# Patient Record
Sex: MALE | Race: WHITE
[De-identification: names, ages, dates, MRNs, and addresses within clinical notes are randomized per-mention and may not be internally consistent; named-entity substitution may affect disease eponyms.]

---

## 2017-07-26 ENCOUNTER — HOSPITAL ENCOUNTER (OUTPATIENT)
Dept: HOSPITAL 53 - M LABDRAW1 | Age: 67
End: 2017-07-26
Attending: INTERNAL MEDICINE
Payer: COMMERCIAL

## 2017-07-26 DIAGNOSIS — D64.9: Primary | ICD-10-CM

## 2017-07-26 DIAGNOSIS — I10: ICD-10-CM

## 2017-07-26 DIAGNOSIS — Z12.5: ICD-10-CM

## 2017-07-26 DIAGNOSIS — E78.00: ICD-10-CM

## 2017-07-26 DIAGNOSIS — R53.83: ICD-10-CM

## 2017-07-26 DIAGNOSIS — Z11.59: ICD-10-CM

## 2017-07-26 LAB
ALBUMIN SERPL BCG-MCNC: 4 GM/DL (ref 3.2–5.2)
ALBUMIN/GLOB SERPL: 1.33 {RATIO} (ref 1–1.93)
ALP SERPL-CCNC: 72 U/L (ref 45–117)
ALT SERPL W P-5'-P-CCNC: 17 U/L (ref 12–78)
ANION GAP SERPL CALC-SCNC: 7 MEQ/L (ref 8–16)
AST SERPL-CCNC: 18 U/L (ref 15–37)
BASOPHILS # BLD AUTO: 0 K/MM3 (ref 0–0.2)
BASOPHILS NFR BLD AUTO: 0.7 % (ref 0–1)
BILIRUB SERPL-MCNC: 0.4 MG/DL (ref 0.2–1)
BUN SERPL-MCNC: 10 MG/DL (ref 7–18)
CALCIUM SERPL-MCNC: 8.8 MG/DL (ref 8.8–10.2)
CHLORIDE SERPL-SCNC: 102 MEQ/L (ref 98–107)
CHOLEST SERPL-MCNC: 124 MG/DL (ref ?–200)
CO2 SERPL-SCNC: 28 MEQ/L (ref 21–32)
CREAT SERPL-MCNC: 0.94 MG/DL (ref 0.7–1.3)
EOSINOPHIL # BLD AUTO: 0.2 K/MM3 (ref 0–0.5)
EOSINOPHIL NFR BLD AUTO: 5.4 % (ref 0–3)
ERYTHROCYTE [DISTWIDTH] IN BLOOD BY AUTOMATED COUNT: 13.2 % (ref 11.5–14.5)
EST. AVERAGE GLUCOSE BLD GHB EST-MCNC: 117 MG/DL (ref 60–110)
GFR SERPL CREATININE-BSD FRML MDRD: > 60 ML/MIN/{1.73_M2} (ref 49–?)
GLUCOSE SERPL-MCNC: 86 MG/DL (ref 80–110)
LARGE UNSTAINED CELL #: 0.1 K/MM3 (ref 0–0.4)
LARGE UNSTAINED CELL %: 2.5 % (ref 0–4)
LYMPHOCYTES # BLD AUTO: 1.4 K/MM3 (ref 1.5–4.5)
LYMPHOCYTES NFR BLD AUTO: 28 % (ref 24–44)
MCH RBC QN AUTO: 30 PG (ref 27–33)
MCHC RBC AUTO-ENTMCNC: 34.4 G/DL (ref 32–36.5)
MCV RBC AUTO: 87.1 FL (ref 80–96)
MONOCYTES # BLD AUTO: 0.3 K/MM3 (ref 0–0.8)
MONOCYTES NFR BLD AUTO: 5.8 % (ref 0–5)
NEUTROPHILS # BLD AUTO: 2.7 K/MM3 (ref 1.8–7.7)
NEUTROPHILS NFR BLD AUTO: 57.7 % (ref 36–66)
PLATELET # BLD AUTO: 226 K/MM3 (ref 150–450)
POTASSIUM SERPL-SCNC: 4.4 MEQ/L (ref 3.5–5.1)
PROT SERPL-MCNC: 7 GM/DL (ref 6.4–8.2)
SODIUM SERPL-SCNC: 137 MEQ/L (ref 136–145)
TRIGL SERPL-MCNC: 98 MG/DL (ref ?–150)
VIT B12 SERPL-MCNC: 233 PG/ML (ref 247–911)
WBC # BLD AUTO: 4.7 K/MM3 (ref 4–10)

## 2017-07-26 PROCEDURE — 83550 IRON BINDING TEST: CPT

## 2017-07-26 PROCEDURE — 82607 VITAMIN B-12: CPT

## 2017-07-26 PROCEDURE — 80061 LIPID PANEL: CPT

## 2017-07-26 PROCEDURE — 82306 VITAMIN D 25 HYDROXY: CPT

## 2017-07-26 PROCEDURE — 82728 ASSAY OF FERRITIN: CPT

## 2017-07-26 PROCEDURE — 86803 HEPATITIS C AB TEST: CPT

## 2017-07-26 PROCEDURE — 83036 HEMOGLOBIN GLYCOSYLATED A1C: CPT

## 2017-07-26 PROCEDURE — 36415 COLL VENOUS BLD VENIPUNCTURE: CPT

## 2017-07-26 PROCEDURE — 84443 ASSAY THYROID STIM HORMONE: CPT

## 2017-07-26 PROCEDURE — 80053 COMPREHEN METABOLIC PANEL: CPT

## 2017-07-26 PROCEDURE — 85025 COMPLETE CBC W/AUTO DIFF WBC: CPT

## 2017-07-27 LAB
FERRITIN SERPL-MCNC: 62 NG/ML (ref 26–388)
IRON SATN MFR SERPL: 25.6 % (ref 19.7–37.4)
TIBC SERPL-MCNC: 332 UG/DL (ref 250–450)

## 2018-01-16 ENCOUNTER — HOSPITAL ENCOUNTER (OUTPATIENT)
Dept: HOSPITAL 53 - M LABDRAW1 | Age: 68
End: 2018-01-16
Attending: PSYCHIATRY & NEUROLOGY
Payer: COMMERCIAL

## 2018-01-16 DIAGNOSIS — R51: Primary | ICD-10-CM

## 2018-01-16 LAB
25(OH)D3 SERPL-MCNC: 41.2 NG/ML (ref 30–100)
ALBUMIN/GLOBULIN RATIO: 1.39 (ref 1–1.93)
ALBUMIN: 3.9 GM/DL (ref 3.2–5.2)
ALKALINE PHOSPHATASE: 76 U/L (ref 45–117)
ALT SERPL W P-5'-P-CCNC: 17 U/L (ref 12–78)
ANION GAP: 4 MEQ/L (ref 8–16)
AST SERPL-CCNC: 19 U/L (ref 7–37)
BASO #: 0 10^3/UL (ref 0–0.2)
BASO %: 0.4 % (ref 0–1)
BILIRUBIN,TOTAL: 0.3 MG/DL (ref 0.2–1)
BLOOD UREA NITROGEN: 12 MG/DL (ref 7–18)
CALCIUM LEVEL: 8.9 MG/DL (ref 8.8–10.2)
CARBON DIOXIDE LEVEL: 28 MEQ/L (ref 21–32)
CHLORIDE LEVEL: 107 MEQ/L (ref 98–107)
CHOLEST SERPL-MCNC: 116 MG/DL (ref ?–200)
CHOLESTEROL RISK RATIO: 2.58 (ref ?–5)
CREATININE FOR GFR: 1 MG/DL (ref 0.7–1.3)
DRVVT CONFIRM PPP QL: 34.8 SEC
DRVVT SCREEN TO CONFIRM RATIO: 0.9 {RATIO} (ref 0–1.2)
EOS #: 0.4 10^3/UL (ref 0–0.5)
EOSINOPHIL NFR BLD AUTO: 8.3 % (ref 0–3)
ERYTHROCYTE SEDIMENTATION RATE: 5 MM/HR (ref 0–20)
EST. AVERAGE GLUCOSE BLD GHB EST-MCNC: 123 MG/DL (ref 60–110)
FOLATE SERPL-MCNC: 8.9 NG/ML (ref 5.4–?)
GFR SERPL CREATININE-BSD FRML MDRD: > 60 ML/MIN/{1.73_M2} (ref 49–?)
GLUCOSE, FASTING: 97 MG/DL (ref 80–110)
HDLC SERPL-MCNC: 45 MG/DL (ref 40–?)
HEMATOCRIT: 40.7 % (ref 42–52)
HEMOGLOBIN: 13.4 G/DL (ref 14–18)
IMMATURE GRANULOCYTE #: 0 10^3/UL (ref 0–0)
IMMATURE GRANULOCYTE %: 0.2 % (ref 0–0)
LDL CHOLESTEROL: 55.8 MG/DL (ref ?–100)
LYMPH #: 1.7 10^3/UL (ref 1.5–4.5)
LYMPH %: 35.9 % (ref 24–44)
MEAN CORPUSCULAR HEMOGLOBIN: 28.9 PG (ref 27–33)
MEAN CORPUSCULAR HGB CONC: 32.9 G/DL (ref 32–36.5)
MEAN CORPUSCULAR VOLUME: 87.9 FL (ref 80–96)
MONO #: 0.4 10^3/UL (ref 0–0.8)
MONO %: 8.9 % (ref 0–5)
NEUTROPHILS #: 2.2 10^3/UL (ref 1.8–7.7)
NEUTROPHILS %: 46.3 % (ref 36–66)
NONHDLC SERPL-MCNC: 71 MG/DL
NRBC BLD AUTO-RTO: 0 % (ref 0–0)
PLATELET COUNT, AUTOMATED: 251 10^3/UL (ref 150–450)
POTASSIUM SERUM: 4.4 MEQ/L (ref 3.5–5.1)
RED BLOOD COUNT: 4.63 10^6/UL (ref 4.3–6.1)
RED CELL DISTRIBUTION WIDTH: 13.6 % (ref 11.5–14.5)
RHEUMATOID FACTOR QUANT: < 10 IU/ML (ref 0–15)
SODIUM LEVEL: 139 MEQ/L (ref 136–145)
TOTAL PROTEIN: 6.7 GM/DL (ref 6.4–8.2)
TRIGLYCERIDES LEVEL: 76 MG/DL (ref ?–150)
VITAMIN B12 LEVEL: 586 PG/ML (ref 247–911)
WHITE BLOOD COUNT: 4.7 10^3/UL (ref 4–10)

## 2018-01-16 PROCEDURE — 82746 ASSAY OF FOLIC ACID SERUM: CPT

## 2018-01-19 LAB
ALBUMIN %: 60.2 % (ref 55.8–66.1)
ALBUMIN: 4.03 GM/DL (ref 3.29–5.55)
ALPHA-1-GLOBULIN %: 4 % (ref 2.9–4.9)
ALPHA-1-GLOBULINS: 0.27 GM/DL (ref 0.17–0.41)
ALPHA-2-GLOBULINS %: 10.1 % (ref 7.1–11.8)
ALPHA-2-GLOBULINS: 0.68 GM/DL (ref 0.42–0.99)
B-GLOBULIN SERPL ELPH-MCNC: 0.43 GM/DL (ref 0.28–0.6)
BETA1 GLOB MFR SERPL ELPH: 6.4 % (ref 4.7–7.2)
BETA2 GLOB MFR SERPL ELPH: 4.7 % (ref 3.2–6.5)
BETA2 GLOB SERPL ELPH-MCNC: 0.31 GM/DL (ref 0.19–0.55)
GAMMA GLOBULIN %: 14.6 % (ref 11.1–18.8)
GAMMA GLOBULINS: 0.98 GM/DL (ref 0.65–1.58)
SPEP INTERPRETATION: (no result)

## 2018-01-22 LAB
VITAMIN B1 LEVEL WHOLE BLOOD: 100.2 NMOL/L (ref 66.5–200)
VITAMIN B6,PYRIDOXAL PHOSPHATE: 12.8 UG/L (ref 5.3–46.7)

## 2018-06-20 ENCOUNTER — HOSPITAL ENCOUNTER (OUTPATIENT)
Dept: HOSPITAL 53 - M LABDRAW1 | Age: 68
End: 2018-06-20
Attending: INTERNAL MEDICINE
Payer: COMMERCIAL

## 2018-06-20 DIAGNOSIS — I10: ICD-10-CM

## 2018-06-20 DIAGNOSIS — E78.00: ICD-10-CM

## 2018-06-20 DIAGNOSIS — Z12.5: Primary | ICD-10-CM

## 2018-06-20 DIAGNOSIS — Z79.899: ICD-10-CM

## 2018-06-20 DIAGNOSIS — R10.12: ICD-10-CM

## 2018-06-20 LAB
ALBUMIN/GLOBULIN RATIO: 1.09 (ref 1–1.93)
ALBUMIN: 3.7 GM/DL (ref 3.2–5.2)
ALKALINE PHOSPHATASE: 76 U/L (ref 45–117)
ALT SERPL W P-5'-P-CCNC: 23 U/L (ref 12–78)
ANION GAP: 8 MEQ/L (ref 8–16)
AST SERPL-CCNC: 27 U/L (ref 7–37)
BILIRUBIN,TOTAL: 0.4 MG/DL (ref 0.2–1)
BLOOD UREA NITROGEN: 14 MG/DL (ref 7–18)
CALCIUM LEVEL: 8.7 MG/DL (ref 8.8–10.2)
CARBON DIOXIDE LEVEL: 29 MEQ/L (ref 21–32)
CHLORIDE LEVEL: 106 MEQ/L (ref 98–107)
CHOLEST SERPL-MCNC: 147 MG/DL (ref ?–200)
CHOLESTEROL RISK RATIO: 2.88 (ref ?–5)
CREATININE FOR GFR: 0.91 MG/DL (ref 0.7–1.3)
ERYTHROCYTE SEDIMENTATION RATE: 2 MM/HR (ref 0–20)
EST. AVERAGE GLUCOSE BLD GHB EST-MCNC: 114 MG/DL (ref 60–110)
GFR SERPL CREATININE-BSD FRML MDRD: > 60 ML/MIN/{1.73_M2} (ref 49–?)
GLUCOSE, FASTING: 80 MG/DL (ref 70–100)
HDLC SERPL-MCNC: 51 MG/DL (ref 40–?)
HEMATOCRIT: 44 % (ref 42–52)
HEMOGLOBIN: 14.2 G/DL (ref 13.5–17.5)
LDL CHOLESTEROL: 80.4 MG/DL (ref ?–100)
MEAN CORPUSCULAR HEMOGLOBIN: 28.5 PG (ref 27–33)
MEAN CORPUSCULAR HGB CONC: 32.3 G/DL (ref 32–36.5)
MEAN CORPUSCULAR VOLUME: 88.4 FL (ref 80–96)
NONHDLC SERPL-MCNC: 96 MG/DL
NRBC BLD AUTO-RTO: 0 % (ref 0–0)
PLATELET COUNT, AUTOMATED: 272 10^3/UL (ref 150–450)
POTASSIUM SERUM: 4.4 MEQ/L (ref 3.5–5.1)
PSA SERPL-MCNC: 0.17 NG/ML (ref ?–4)
RED BLOOD COUNT: 4.98 10^6/UL (ref 4.3–6.1)
RED CELL DISTRIBUTION WIDTH: 14 % (ref 11.5–14.5)
SODIUM LEVEL: 143 MEQ/L (ref 136–145)
TOTAL PROTEIN: 7.1 GM/DL (ref 6.4–8.2)
TRIGLYCERIDES LEVEL: 78 MG/DL (ref ?–150)
WHITE BLOOD COUNT: 6.4 10^3/UL (ref 4–10)

## 2018-06-20 PROCEDURE — 82150 ASSAY OF AMYLASE: CPT

## 2018-06-21 LAB
AMYLASE SERPL-CCNC: 69 U/L (ref 25–115)
LIPASE: 114 U/L (ref 73–393)

## 2018-06-22 LAB — H PYLORI IGM SER-ACNC: <9 UNITS (ref 0–8.9)

## 2018-06-26 ENCOUNTER — HOSPITAL ENCOUNTER (OUTPATIENT)
Dept: HOSPITAL 53 - M RAD | Age: 68
End: 2018-06-26
Attending: INTERNAL MEDICINE
Payer: COMMERCIAL

## 2018-06-26 DIAGNOSIS — R10.12: Primary | ICD-10-CM

## 2019-06-19 ENCOUNTER — HOSPITAL ENCOUNTER (OUTPATIENT)
Dept: HOSPITAL 53 - M RAD | Age: 69
End: 2019-06-19
Attending: PEDIATRICS
Payer: COMMERCIAL

## 2019-06-19 DIAGNOSIS — R05: Primary | ICD-10-CM

## 2019-06-20 NOTE — REP
CHEST, TWO VIEWS:

 

COMPARISON:  07/02/2009

 

There is no evidence of acute infiltrate.

 

No pleural effusion is seen.

 

The heart is normal in size.

 

The mediastinal silhouette is unremarkable.

 

The visualized osseous structures are intact.

 

There are degenerative changes of the spine.

 

IMPRESSION:

 

No acute pulmonary disease.

 

 

Electronically Signed by

Sivakumar Oh MD 06/20/2019 09:15 A

## 2019-07-09 ENCOUNTER — HOSPITAL ENCOUNTER (OUTPATIENT)
Dept: HOSPITAL 53 - M RAD | Age: 69
End: 2019-07-09
Attending: SPECIALIST
Payer: COMMERCIAL

## 2019-07-09 DIAGNOSIS — M77.9: ICD-10-CM

## 2019-07-09 DIAGNOSIS — M25.552: Primary | ICD-10-CM

## 2019-07-09 NOTE — REP
Pelvis, left hip:  Three views.

 

History:  Iliac crest pain.  Comparison is made with images from CT study of the

abdomen June 26, 2018.

 

Findings:  AP view of the pelvis and AP and frog-leg views of the left hip are

presented.  The femoral heads are smooth and rounded and hip joint spaces are

preserved.  Transitionalized lumbosacral junction is seen.  SI joints are

unremarkable.  Laminectomy has been performed at L3.  There is spurring along the

anterolateral contour of the iliac bone bilaterally consistent with muscle

attachment spurring.  This is bilateral, symmetric and benign in appearance.

Bones, joints and soft tissues are otherwise unremarkable.

 

Impression:

 

Mild muscle attachment spurring in the region of the anterior inferior iliac

spine bilaterally and symmetrically.  Otherwise unremarkable pelvis, left hip

radiographs.  Transitionalized lumbosacral junction.  Laminectomy defect in the

lower lumbar spine.

 

 

Electronically Signed by

David Low MD 07/09/2019 04:43 P

## 2019-07-31 ENCOUNTER — HOSPITAL ENCOUNTER (OUTPATIENT)
Dept: HOSPITAL 53 - M LABDRAW1 | Age: 69
End: 2019-07-31
Attending: INTERNAL MEDICINE
Payer: COMMERCIAL

## 2019-07-31 DIAGNOSIS — Z13.1: ICD-10-CM

## 2019-07-31 DIAGNOSIS — E55.9: Primary | ICD-10-CM

## 2019-07-31 DIAGNOSIS — I10: ICD-10-CM

## 2019-07-31 DIAGNOSIS — Z12.5: ICD-10-CM

## 2019-07-31 DIAGNOSIS — E53.8: ICD-10-CM

## 2019-07-31 DIAGNOSIS — Z79.899: ICD-10-CM

## 2019-07-31 DIAGNOSIS — E78.00: ICD-10-CM

## 2019-07-31 DIAGNOSIS — M89.8X8: ICD-10-CM

## 2019-07-31 LAB
25(OH)D3 SERPL-MCNC: 41.8 NG/ML (ref 30–100)
ALBUMIN SERPL BCG-MCNC: 3.9 GM/DL (ref 3.2–5.2)
ALT SERPL W P-5'-P-CCNC: 17 U/L (ref 12–78)
BILIRUB SERPL-MCNC: 0.4 MG/DL (ref 0.2–1)
BUN SERPL-MCNC: 14 MG/DL (ref 7–18)
CALCIUM SERPL-MCNC: 8.9 MG/DL (ref 8.8–10.2)
CHLORIDE SERPL-SCNC: 106 MEQ/L (ref 98–107)
CHOLEST SERPL-MCNC: 135 MG/DL (ref ?–200)
CHOLEST/HDLC SERPL: 2.7 {RATIO} (ref ?–5)
CO2 SERPL-SCNC: 27 MEQ/L (ref 21–32)
CREAT SERPL-MCNC: 0.95 MG/DL (ref 0.7–1.3)
CRP SERPL-MCNC: < 0.3 MG/DL (ref 0–0.3)
ERYTHROCYTE [SEDIMENTATION RATE] IN BLOOD BY WESTERGREN METHOD: 5 MM/HR (ref 0–20)
EST. AVERAGE GLUCOSE BLD GHB EST-MCNC: 120 MG/DL (ref 60–110)
FOLATE SERPL-MCNC: 10.3 NG/ML
GFR SERPL CREATININE-BSD FRML MDRD: > 60 ML/MIN/{1.73_M2} (ref 49–?)
GLUCOSE SERPL-MCNC: 84 MG/DL (ref 70–100)
HCT VFR BLD AUTO: 41.6 % (ref 42–52)
HDLC SERPL-MCNC: 50 MG/DL (ref 40–?)
HGB BLD-MCNC: 13.6 G/DL (ref 13.5–17.5)
LDLC SERPL CALC-MCNC: 74 MG/DL (ref ?–100)
MAGNESIUM SERPL-MCNC: 2 MG/DL (ref 1.8–2.4)
MCH RBC QN AUTO: 28.5 PG (ref 27–33)
MCHC RBC AUTO-ENTMCNC: 32.7 G/DL (ref 32–36.5)
MCV RBC AUTO: 87.2 FL (ref 80–96)
NONHDLC SERPL-MCNC: 85 MG/DL
PLATELET # BLD AUTO: 246 10^3/UL (ref 150–450)
POTASSIUM SERPL-SCNC: 4.3 MEQ/L (ref 3.5–5.1)
PROT SERPL-MCNC: 7.2 GM/DL (ref 6.4–8.2)
RBC # BLD AUTO: 4.77 10^6/UL (ref 4.3–6.1)
SODIUM SERPL-SCNC: 140 MEQ/L (ref 136–145)
TRIGL SERPL-MCNC: 57 MG/DL (ref ?–150)
VIT B12 SERPL-MCNC: 385 PG/ML
WBC # BLD AUTO: 4.5 10^3/UL (ref 4–10)

## 2019-07-31 PROCEDURE — 83735 ASSAY OF MAGNESIUM: CPT

## 2019-07-31 PROCEDURE — 85027 COMPLETE CBC AUTOMATED: CPT

## 2019-07-31 PROCEDURE — 85652 RBC SED RATE AUTOMATED: CPT

## 2019-07-31 PROCEDURE — 82306 VITAMIN D 25 HYDROXY: CPT

## 2019-07-31 PROCEDURE — 80061 LIPID PANEL: CPT

## 2019-07-31 PROCEDURE — 82746 ASSAY OF FOLIC ACID SERUM: CPT

## 2019-07-31 PROCEDURE — 36415 COLL VENOUS BLD VENIPUNCTURE: CPT

## 2019-07-31 PROCEDURE — 83036 HEMOGLOBIN GLYCOSYLATED A1C: CPT

## 2019-07-31 PROCEDURE — 82607 VITAMIN B-12: CPT

## 2019-07-31 PROCEDURE — 80053 COMPREHEN METABOLIC PANEL: CPT

## 2019-07-31 PROCEDURE — 86140 C-REACTIVE PROTEIN: CPT

## 2019-10-25 ENCOUNTER — HOSPITAL ENCOUNTER (OUTPATIENT)
Dept: HOSPITAL 53 - M SFHCPLAZ | Age: 69
End: 2019-10-25
Attending: INTERNAL MEDICINE
Payer: COMMERCIAL

## 2019-10-25 DIAGNOSIS — Z01.818: Primary | ICD-10-CM

## 2019-10-25 DIAGNOSIS — E78.00: ICD-10-CM

## 2019-10-25 LAB
BUN SERPL-MCNC: 17 MG/DL (ref 7–18)
CALCIUM SERPL-MCNC: 9.3 MG/DL (ref 8.8–10.2)
CHLORIDE SERPL-SCNC: 105 MEQ/L (ref 98–107)
CO2 SERPL-SCNC: 31 MEQ/L (ref 21–32)
CREAT SERPL-MCNC: 0.98 MG/DL (ref 0.7–1.3)
EST. AVERAGE GLUCOSE BLD GHB EST-MCNC: 114 MG/DL (ref 60–110)
GFR SERPL CREATININE-BSD FRML MDRD: > 60 ML/MIN/{1.73_M2} (ref 42–?)
GLUCOSE SERPL-MCNC: 96 MG/DL (ref 70–100)
POTASSIUM SERPL-SCNC: 4.9 MEQ/L (ref 3.5–5.1)
SODIUM SERPL-SCNC: 141 MEQ/L (ref 136–145)

## 2019-12-10 ENCOUNTER — HOSPITAL ENCOUNTER (OUTPATIENT)
Dept: HOSPITAL 53 - M RAD | Age: 69
End: 2019-12-10
Attending: INTERNAL MEDICINE
Payer: COMMERCIAL

## 2019-12-10 DIAGNOSIS — M16.0: Primary | ICD-10-CM

## 2019-12-10 DIAGNOSIS — M85.48: ICD-10-CM

## 2019-12-10 DIAGNOSIS — M94.252: ICD-10-CM

## 2019-12-10 NOTE — REP
MRI left hip and pelvis and iliac crest.  Without contrast.

 

History:  Iliac crest bone pain and hip pain on the left.  The patient describes

pain along the superior margin of the iliac crest on the left laterally.

 

Comparison radiographs July 9, 2019.  Comparison is made with CT images obtained

of the abdomen, not the pelvis, from June 26, 2018.

 

Technique:  Large field of view bilateral coronal T1 and STIR T2 sequences are

acquired.  In addition, smaller field of view high resolution T2 fat sat imaging

is obtained of the left hip in all three planes.  The axial T2 fat sat sequence

is extended cranially to include the iliac crest.

 

Findings: Cortical and medullary bone signal intensity is normal in the proximal

femurs and in the visualized bony pelvic ring.  There is no evidence to suggest

avascular necrosis.  There is some acetabular and femoral spurring mild in degree

bilaterally consistent with bilateral hip osteoarthritis. Tiny subcortical cysts

are seen along the superior and inferior margin of the femoral head on the left.

Chondromalacia is noted on small field of view images on the left.  There are

some degenerative changes bilaterally in the acetabular labral cartilage.  There

is a small amount of joint fluid in the right hip.  No peritrochanteric fluid

collection is appreciated on either side.

 

The pelvic radiographs demonstrate a tiny exostosis at the level of the anterior

inferior iliac spine, rectus femoris insertion.  At this level, there is a tiny

amount of T2 hyperintensity in the lateral aspect of the tendon insertion.  The

bulk of the tendon insertion is unremarkable.  This T2 signal intensity is quite

subtle.

 

There is actually more conspicuous fluid on T2-weighted axial images along the

course of the distal iliopsoas muscle and tendon above the left hip and distal to

the left hip, just above the insertion on the lesser trochanter.  This may

reflect iliopsoas tendinosis tendonitis change.  This is considerably inferior to

the level of the patient's pain.

 

T2-weighted scans demonstrate minimal hypertrophy of the central gland in the

prostate with some T2 hyperintense cysts or nodules.

 

Impression:

1.  Mild bilateral hip joint osteoarthritis. Chondromalacia and subcortical cyst

formation.  Degenerative acetabular labral cartilage changes.

2.  There is some fluid above and below the hip along the course of the iliopsoas

on the left extending down to the distal insertion on the lesser trochanter.

3.  Very subtle amount of fluid is seen at the rectus femoris insertion,

laterally, at the anterior inferior iliac spine.  No other abnormality.

 

 

Electronically Signed by

David Low MD 12/10/2019 11:01 A

## 2020-07-16 ENCOUNTER — HOSPITAL ENCOUNTER (OUTPATIENT)
Dept: HOSPITAL 53 - M PLALAB | Age: 70
End: 2020-07-16
Attending: INTERNAL MEDICINE
Payer: COMMERCIAL

## 2020-07-16 DIAGNOSIS — R00.2: ICD-10-CM

## 2020-07-16 DIAGNOSIS — R10.12: Primary | ICD-10-CM

## 2020-07-16 DIAGNOSIS — Z13.1: ICD-10-CM

## 2020-07-16 DIAGNOSIS — E78.00: ICD-10-CM

## 2020-07-16 DIAGNOSIS — Z79.899: ICD-10-CM

## 2020-07-16 DIAGNOSIS — I10: ICD-10-CM

## 2020-07-16 DIAGNOSIS — Z12.5: ICD-10-CM

## 2020-07-16 LAB
25(OH)D3 SERPL-MCNC: 36.9 NG/ML (ref 30–100)
ALBUMIN SERPL BCG-MCNC: 4.1 GM/DL (ref 3.2–5.2)
ALT SERPL W P-5'-P-CCNC: 21 U/L (ref 12–78)
BASOPHILS # BLD AUTO: 0 10^3/UL (ref 0–0.2)
BASOPHILS NFR BLD AUTO: 0.6 % (ref 0–1)
BILIRUB SERPL-MCNC: 0.6 MG/DL (ref 0.2–1)
BUN SERPL-MCNC: 15 MG/DL (ref 7–18)
CALCIUM SERPL-MCNC: 9.2 MG/DL (ref 8.8–10.2)
CHLORIDE SERPL-SCNC: 105 MEQ/L (ref 98–107)
CHOLEST SERPL-MCNC: 138 MG/DL (ref ?–200)
CHOLEST/HDLC SERPL: 3.14 {RATIO} (ref ?–5)
CO2 SERPL-SCNC: 29 MEQ/L (ref 21–32)
CREAT SERPL-MCNC: 1.03 MG/DL (ref 0.7–1.3)
EOSINOPHIL # BLD AUTO: 0.6 10^3/UL (ref 0–0.5)
EOSINOPHIL NFR BLD AUTO: 9.3 % (ref 0–3)
EST. AVERAGE GLUCOSE BLD GHB EST-MCNC: 105 MG/DL (ref 60–110)
GFR SERPL CREATININE-BSD FRML MDRD: > 60 ML/MIN/{1.73_M2} (ref 42–?)
GLUCOSE SERPL-MCNC: 86 MG/DL (ref 70–100)
HCT VFR BLD AUTO: 44.9 % (ref 42–52)
HCT VFR BLD AUTO: 45.8 % (ref 42–52)
HDLC SERPL-MCNC: 44 MG/DL (ref 40–?)
HGB BLD-MCNC: 14.3 G/DL (ref 13.5–17.5)
HGB BLD-MCNC: 14.5 G/DL (ref 13.5–17.5)
LDLC SERPL CALC-MCNC: 76 MG/DL (ref ?–100)
LYMPHOCYTES # BLD AUTO: 2.4 10^3/UL (ref 1.5–5)
LYMPHOCYTES NFR BLD AUTO: 36.5 % (ref 24–44)
MAGNESIUM SERPL-MCNC: 2.2 MG/DL (ref 1.8–2.4)
MCH RBC QN AUTO: 28.7 PG (ref 27–33)
MCH RBC QN AUTO: 29.5 PG (ref 27–33)
MCHC RBC AUTO-ENTMCNC: 31.2 G/DL (ref 32–36.5)
MCHC RBC AUTO-ENTMCNC: 32.3 G/DL (ref 32–36.5)
MCV RBC AUTO: 91.3 FL (ref 80–96)
MCV RBC AUTO: 92 FL (ref 80–96)
MONOCYTES # BLD AUTO: 0.5 10^3/UL (ref 0–0.8)
MONOCYTES NFR BLD AUTO: 7.6 % (ref 0–5)
NEUTROPHILS # BLD AUTO: 3 10^3/UL (ref 1.5–8.5)
NEUTROPHILS NFR BLD AUTO: 45.8 % (ref 36–66)
NONHDLC SERPL-MCNC: 94 MG/DL
PLATELET # BLD AUTO: 254 10^3/UL (ref 150–450)
PLATELET # BLD AUTO: 254 10^3/UL (ref 150–450)
POTASSIUM SERPL-SCNC: 4.9 MEQ/L (ref 3.5–5.1)
PROT SERPL-MCNC: 7.4 GM/DL (ref 6.4–8.2)
RBC # BLD AUTO: 4.92 10^6/UL (ref 4.3–6.1)
RBC # BLD AUTO: 4.98 10^6/UL (ref 4.3–6.1)
SODIUM SERPL-SCNC: 139 MEQ/L (ref 136–145)
TRIGL SERPL-MCNC: 89 MG/DL (ref ?–150)
TSH SERPL DL<=0.005 MIU/L-ACNC: 2.64 UIU/ML (ref 0.36–3.74)
VIT B12 SERPL-MCNC: 704 PG/ML (ref 247–911)
WBC # BLD AUTO: 6.4 10^3/UL (ref 4–10)
WBC # BLD AUTO: 6.6 10^3/UL (ref 4–10)

## 2020-07-16 PROCEDURE — 82607 VITAMIN B-12: CPT

## 2020-07-16 PROCEDURE — 36415 COLL VENOUS BLD VENIPUNCTURE: CPT

## 2020-07-16 PROCEDURE — 83036 HEMOGLOBIN GLYCOSYLATED A1C: CPT

## 2020-07-16 PROCEDURE — 84443 ASSAY THYROID STIM HORMONE: CPT

## 2020-07-16 PROCEDURE — 85025 COMPLETE CBC W/AUTO DIFF WBC: CPT

## 2020-07-16 PROCEDURE — 85027 COMPLETE CBC AUTOMATED: CPT

## 2020-07-16 PROCEDURE — 80061 LIPID PANEL: CPT

## 2020-07-16 PROCEDURE — 83735 ASSAY OF MAGNESIUM: CPT

## 2020-07-16 PROCEDURE — 82306 VITAMIN D 25 HYDROXY: CPT

## 2020-07-16 PROCEDURE — 80053 COMPREHEN METABOLIC PANEL: CPT

## 2021-02-10 ENCOUNTER — HOSPITAL ENCOUNTER (OUTPATIENT)
Dept: HOSPITAL 53 - M SFHCPLAZ | Age: 71
End: 2021-02-10
Attending: INTERNAL MEDICINE
Payer: COMMERCIAL

## 2021-02-10 DIAGNOSIS — Z20.828: Primary | ICD-10-CM

## 2021-07-21 ENCOUNTER — HOSPITAL ENCOUNTER (OUTPATIENT)
Dept: HOSPITAL 53 - M PLALAB | Age: 71
End: 2021-07-21
Attending: INTERNAL MEDICINE
Payer: COMMERCIAL

## 2021-07-21 DIAGNOSIS — Z00.00: Primary | ICD-10-CM

## 2021-07-21 DIAGNOSIS — I10: ICD-10-CM

## 2021-07-21 DIAGNOSIS — E55.9: ICD-10-CM

## 2021-07-21 DIAGNOSIS — E53.8: ICD-10-CM

## 2021-07-21 DIAGNOSIS — Z12.5: ICD-10-CM

## 2021-07-21 DIAGNOSIS — E78.00: ICD-10-CM

## 2021-07-21 LAB
25(OH)D3 SERPL-MCNC: 46.5 NG/ML (ref 30–100)
ALBUMIN SERPL BCG-MCNC: 4.2 GM/DL (ref 3.2–5.2)
ALT SERPL W P-5'-P-CCNC: 24 U/L (ref 12–78)
BASOPHILS # BLD AUTO: 0 10^3/UL (ref 0–0.2)
BASOPHILS NFR BLD AUTO: 0.7 % (ref 0–1)
BILIRUB SERPL-MCNC: 0.6 MG/DL (ref 0.2–1)
BUN SERPL-MCNC: 15 MG/DL (ref 7–18)
CALCIUM SERPL-MCNC: 9.1 MG/DL (ref 8.8–10.2)
CHLORIDE SERPL-SCNC: 105 MEQ/L (ref 98–107)
CHOLEST SERPL-MCNC: 145 MG/DL (ref ?–200)
CHOLEST/HDLC SERPL: 2.64 {RATIO} (ref ?–5)
CO2 SERPL-SCNC: 32 MEQ/L (ref 21–32)
CREAT SERPL-MCNC: 0.98 MG/DL (ref 0.7–1.3)
EOSINOPHIL # BLD AUTO: 0.4 10^3/UL (ref 0–0.5)
EOSINOPHIL NFR BLD AUTO: 6.7 % (ref 0–3)
EST. AVERAGE GLUCOSE BLD GHB EST-MCNC: 114 MG/DL (ref 60–110)
GFR SERPL CREATININE-BSD FRML MDRD: > 60 ML/MIN/{1.73_M2} (ref 42–?)
GLUCOSE SERPL-MCNC: 90 MG/DL (ref 70–100)
HCT VFR BLD AUTO: 43.9 % (ref 42–52)
HDLC SERPL-MCNC: 55 MG/DL (ref 40–?)
HGB BLD-MCNC: 14.1 G/DL (ref 13.5–17.5)
LDLC SERPL CALC-MCNC: 76 MG/DL (ref ?–100)
LYMPHOCYTES # BLD AUTO: 2.5 10^3/UL (ref 1.5–5)
LYMPHOCYTES NFR BLD AUTO: 42.3 % (ref 24–44)
MAGNESIUM SERPL-MCNC: 2.2 MG/DL (ref 1.8–2.4)
MCH RBC QN AUTO: 28.6 PG (ref 27–33)
MCHC RBC AUTO-ENTMCNC: 32.1 G/DL (ref 32–36.5)
MCV RBC AUTO: 89 FL (ref 80–96)
MONOCYTES # BLD AUTO: 0.5 10^3/UL (ref 0–0.8)
MONOCYTES NFR BLD AUTO: 9.2 % (ref 2–8)
NEUTROPHILS # BLD AUTO: 2.4 10^3/UL (ref 1.5–8.5)
NEUTROPHILS NFR BLD AUTO: 40.9 % (ref 36–66)
NONHDLC SERPL-MCNC: 90 MG/DL
PLATELET # BLD AUTO: 254 10^3/UL (ref 150–450)
POTASSIUM SERPL-SCNC: 4.3 MEQ/L (ref 3.5–5.1)
PROT SERPL-MCNC: 7.1 GM/DL (ref 6.4–8.2)
RBC # BLD AUTO: 4.93 10^6/UL (ref 4.3–6.1)
SODIUM SERPL-SCNC: 139 MEQ/L (ref 136–145)
TRIGL SERPL-MCNC: 70 MG/DL (ref ?–150)
VIT B12 SERPL-MCNC: 672 PG/ML (ref 247–911)
WBC # BLD AUTO: 5.9 10^3/UL (ref 4–10)

## 2021-07-21 PROCEDURE — 82306 VITAMIN D 25 HYDROXY: CPT

## 2021-07-21 PROCEDURE — 80053 COMPREHEN METABOLIC PANEL: CPT

## 2021-07-21 PROCEDURE — 83036 HEMOGLOBIN GLYCOSYLATED A1C: CPT

## 2021-07-21 PROCEDURE — 36415 COLL VENOUS BLD VENIPUNCTURE: CPT

## 2021-07-21 PROCEDURE — 82607 VITAMIN B-12: CPT

## 2021-07-21 PROCEDURE — 83735 ASSAY OF MAGNESIUM: CPT

## 2021-07-21 PROCEDURE — 85025 COMPLETE CBC W/AUTO DIFF WBC: CPT

## 2021-07-21 PROCEDURE — 80061 LIPID PANEL: CPT

## 2021-08-09 ENCOUNTER — HOSPITAL ENCOUNTER (OUTPATIENT)
Dept: HOSPITAL 53 - M PLAIMG | Age: 71
End: 2021-08-09
Attending: INTERNAL MEDICINE
Payer: COMMERCIAL

## 2021-08-09 DIAGNOSIS — M25.571: Primary | ICD-10-CM

## 2021-08-26 ENCOUNTER — HOSPITAL ENCOUNTER (OUTPATIENT)
Dept: HOSPITAL 53 - M PLAIMG | Age: 71
End: 2021-08-26
Attending: ORTHOPAEDIC SURGERY
Payer: COMMERCIAL

## 2021-08-26 DIAGNOSIS — M79.671: ICD-10-CM

## 2021-08-26 DIAGNOSIS — M19.071: Primary | ICD-10-CM

## 2021-08-26 DIAGNOSIS — M21.6X1: ICD-10-CM

## 2021-08-26 NOTE — REP
INDICATION:

PAIN RT FOOT W/ ACQUIRED DEFORMITIES.



COMPARISON:

Comparison radiographs of the right ankle are from August 9, 2021.



TECHNIQUE:

Axial, coronal, and sagittal imaging planes utilized.  T1 and T2 weighted sequences

are obtained with and without fat saturation.  Right foot MRI study.



FINDINGS:

There are arthropathy associated subcortical cyst changes in the distal fibula, the

lateral talar dome, and the anterolateral tibial plafond, see ankle MRI report this

same date.



The medial subtalar articulation is abnormal with irregularity and narrowing.  I

cannot exclude a fibrous talocalcaneal tarsal coalition versus medial facet subtalar

joint osteoarthritis.



There is mild osteoarthritic spurring and joint space narrowing in the talar navicular

articulation.  The plantar arch is somewhat shallow.  There is plantar calcaneal

spurring.  Plantar fascia is smooth.  No metatarsal or phalangeal abnormality is

appreciated.  There is a small quantity of fluid at the 1st MTP joint.  No soft tissue

mass or cyst is seen.



IMPRESSION:

Irregularity narrowing and subcortical cyst formation in the medial facet of the

subtalar for fibrous joint, question talocalcaneal tarsal coalition versus

osteoarthritis.  There are osteo arthritis associated subcortical cysts formed at the

ankle as described in the ankle MRI report.  The plantar arch is somewhat shallow.

Osteoarthritic spurring is also noted at the talonavicular articulation.





<Electronically signed by Adin Low > 08/26/21 [id number]

## 2021-08-26 NOTE — REP
INDICATION:

PAIN RT FOOT W/ ACQUIRED DEFORMITIES.



COMPARISON:

Comparison radiographs of the right ankle are from August 9, 2021.



TECHNIQUE:

Axial, coronal, and sagittal imaging planes utilized.  T1 and T2 weighted sequences

are obtained with and without fat saturation in the usual fashion.



FINDINGS:

There is mild plantar calcaneal spurring.  Plantar fascia are smooth.  There is no

evidence of significant joint effusion.



There are osteoarthritic changes at the ankle with subcortical cyst formation

involving the lateral talar dome as well as the adjacent subcortical surface of the

distal fibula and, to a lesser degree, the adjacent surface of the anterolateral

tibial plafond.  There is joint space narrowing in the tibiotalar articulation.  There

are similar subcortical cysts on either side of the central subtalar articulation.

There is mild spurring and sclerosis associated with the talonavicular articulation.

No significant joint effusion is seen.  There is not felt to be evidence of

osteochondritis desiccans.  No evidence of loose body.



Achilles tendon is normal in course caliber and signal intensity.  No ligamentous

disruption is seen at the ankle.



Tibialis posterior tendon, flexor digitorum, and flexor hallucis longus tendons appear

intact medially.  The peroneus tendons appear intact.  No evidence of extensor

tendinopathy.



IMPRESSION:

Osteoarthritic changes involving the tibiotalar, talofibular, subtalar, and

talonavicular articulations.  There is arthritis associated subcortical cyst formation

at these locations; particularly the lateral aspect of the tibiotalar articulation and

the talofibular articulation.  No evidence to suggest loose body or osteochondritis

dissecans..





<Electronically signed by Adin Low > 08/26/21 [id number]

## 2021-11-08 ENCOUNTER — HOSPITAL ENCOUNTER (OUTPATIENT)
Dept: HOSPITAL 53 - M LABSMTC | Age: 71
End: 2021-11-08
Attending: ANESTHESIOLOGY
Payer: COMMERCIAL

## 2021-11-08 DIAGNOSIS — Z11.52: ICD-10-CM

## 2021-11-08 DIAGNOSIS — Z01.812: Primary | ICD-10-CM

## 2021-11-12 ENCOUNTER — HOSPITAL ENCOUNTER (OUTPATIENT)
Dept: HOSPITAL 53 - M OPP | Age: 71
Discharge: HOME | End: 2021-11-12
Attending: INTERNAL MEDICINE
Payer: COMMERCIAL

## 2021-11-12 VITALS — BODY MASS INDEX: 23.34 KG/M2 | WEIGHT: 154 LBS | HEIGHT: 68 IN

## 2021-11-12 VITALS — SYSTOLIC BLOOD PRESSURE: 127 MMHG | DIASTOLIC BLOOD PRESSURE: 65 MMHG

## 2021-11-12 DIAGNOSIS — K64.0: ICD-10-CM

## 2021-11-12 DIAGNOSIS — K22.89: ICD-10-CM

## 2021-11-12 DIAGNOSIS — I10: ICD-10-CM

## 2021-11-12 DIAGNOSIS — R12: Primary | ICD-10-CM

## 2021-11-12 DIAGNOSIS — K57.30: ICD-10-CM

## 2021-11-12 DIAGNOSIS — Z12.11: ICD-10-CM

## 2021-11-12 DIAGNOSIS — K31.89: ICD-10-CM

## 2021-11-12 DIAGNOSIS — Z91.041: ICD-10-CM

## 2021-11-12 PROCEDURE — 45378 DIAGNOSTIC COLONOSCOPY: CPT

## 2021-11-12 PROCEDURE — 43239 EGD BIOPSY SINGLE/MULTIPLE: CPT

## 2021-11-12 PROCEDURE — 88305 TISSUE EXAM BY PATHOLOGIST: CPT

## 2021-11-12 NOTE — ROOR
________________________________________________________________________________

Patient Name: Kim Chavarria          Procedure Date: 11/12/2021 11:15 AM

MRN: [id number]                          Account Number: [id number]

YOB: 1950               Age: 71

Room: [facility]                            Gender: Male

Note Status: Finalized                 

________________________________________________________________________________

 

Procedure:            Upper Endoscopy + Biopsies

Indications:          Epigastric abdominal pain, Heartburn

Providers:            Quoc Trevino MD

Referring MD:         Jose R Truong MD

Requesting Provider:  

Medicines:            Monitored Anesthesia Care

Complications:        No immediate complications.

________________________________________________________________________________

Procedure:            Pre-Anesthesia Assessment:

                      - The heart rate, respiratory rate, oxygen saturations, 

                      blood pressure, adequacy of pulmonary ventilation, and 

                      response to care were monitored throughout the procedure.

                      The Endoscope was introduced through the mouth, and 

                      advanced to the second part of duodenum. The upper GI 

                      endoscopy was accomplished without difficulty. The 

                      patient tolerated the procedure well.

                                                                                

Findings:

     The Z-line was variable and was found 40 cm from the incisors. Multiple 

     biopsies were obtained with cold forceps for evaluation to rule out 

     Donaldson's Esophagus randomly at the gastroesophageal junction.

     Localized moderate inflammation characterized by adherent blood, 

     congestion (edema), erosions, erythema, granularity and mucus was found 

     on the greater curvature of the stomach. Biopsies were taken with a cold 

     forceps for Helicobacter pylori testing.

     The exam of the duodenum was otherwise normal.

                                                                                

Impression:           - Z-line variable, 40 cm from the incisors.

                      - Mucosal changes suspicious for gastritis. Biopsied.

                      - Multiple biopsies were obtained at the 

                      gastroesophageal junction.

                      - The examination was otherwise normal.

Recommendation:       - Patient has a contact number available for 

                      emergencies. The signs and symptoms of potential delayed 

                      complications were discussed with the patient. Return to 

                      normal activities tomorrow. Written discharge 

                      instructions were provided to the patient.

                      - High fiber diet.

                      - Discharge patient to home.

                      - Follow an antireflux regimen.

                      - Continue present medications.

                      - Await pathology results.

                      - Telephone GI clinic for pathology results in 1 week.

                      - Return to referring physician.

                      - Use sucralfate tablets 1 gram PO BID.

                      - The findings and recommendations were discussed with 

                      the patient.

                                                                                

Procedure Code(s):    --- Professional ---

                      51845, Esophagogastroduodenoscopy, flexible, transoral; 

                      with biopsy, single or multiple

Diagnosis Code(s):    --- Professional ---

                      K22.8, Other specified diseases of esophagus

                      K31.89, Other diseases of stomach and duodenum

                      R10.13, Epigastric pain

                      R12, Heartburn

 

CPT copyright 2019 [employer]. All rights reserved.

 

The codes documented in this report are preliminary and upon  review may 

be revised to meet current compliance requirements.

 

Quoc Trevino MD

____________________

Quoc Trevino MD

11/12/2021 11:30:37 AM

Electronically signed by Quoc Trevino MD

Number of Addenda: 0

 

Note Initiated On: 11/12/2021 11:15 AM

Estimated Blood Loss: Estimated blood loss: none.

## 2021-11-12 NOTE — CCD
Summarization Of Episode

                             Created on: 2021



IVONNEROXANNE VARMA

External Reference #: [id number]

: 1950

Sex: Undifferentiated



Demographics





                          Address                   [address] [place], [place] [address]

 

                          Home Phone                [phone]

 

                          Preferred Language        English

 

                          Marital Status            Unknown

 

                          Uatsdin Affiliation     Unknown

 

                          Race                      Unknown

 

                          Ethnic Group              Not  or 





Author





                          Author                    [facility]

 

                          Organization              [facility]

 

                          Address                   Unknown

 

                          Phone                     Unavailable







Support





                Name            Relationship    Address         Phone

 

                RE              Next Of Kin     Unknown         Unavailable

 

                CONTACT,  NO    Next Of Kin     Unknown         [phone]

 

                Emilie Simmons Next Of Kin     Unknown         Unavailable

 

                    [facility] Next Of Kin         [address]

[address]

[place]  [address]                    [phone]

 

                    AYAKA JOHNSTON  Next Of Kin         [facility] [place], [place]  [address] [phone]

 

                Emilie Simmons ECON            Unknown         Unavailable

 

                    AYAKA JOHNSTON  ECON                [address]

[place]  [address]                    Unavailable







Care Team Providers





                    Care Team Member Name Role                Phone

 

                    MARCOS Trevino MD Unavailable         Unavailable

 

                    MARCOS Trevino MD Unavailable         Unavailable

 

                    MARCOS Trevino MD Unavailable         Unavailable

 

                    MARCOS Trevino MD Unavailable         Unavailable

 

                    MARCOS Trevino MD Unavailable         Unavailable

 

                    MARCOS Trevino MD Unavailable         Unavailable

 

                    MARCOS Trevino MD Unavailable         Unavailable

 

                    MARCOS Trevino MD Unavailable         Unavailable

 

                    MARCOS Trevino MD Unavailable         Unavailable

 

                    MARCOS Trevino MD Unavailable         Unavailable

 

                    MARCOS Trevino MD Unavailable         Unavailable

 

                    MARCOS Trevino MD Unavailable         Unavailable

 

                    MARCOS Trevino MD Unavailable         Unavailable

 

                    MARCOS Trevino MD Unavailable         Unavailable

 

                    MARCOS Trevino MD Unavailable         Unavailable

 

                    MARCOS Trevino MD Unavailable         Unavailable

 

                    MARCOS Trevino MD Unavailable         Unavailable

 

                    MARCOS Trevino MD Unavailable         Unavailable

 

                    MARCOS Trevino MD Unavailable         Unavailable

 

                    MARCOS Trevino MD Unavailable         Unavailable

 

                    MARCOS Trevino MD Unavailable         Unavailable

 

                    MARCOS Trevino MD Unavailable         Unavailable

 

                    MARCOS Trevino MD Unavailable         Unavailable

 

                    MARCOS Trevino MD Unavailable         Unavailable

 

                    MARCOS Trevino MD Unavailable         Unavailable

 

                    MARCOS Trevino MD Unavailable         Unavailable

 

                    MARCOS Trevino MD Unavailable         Unavailable

 

                    MARCOS Trevino MD Unavailable         Unavailable

 

                    MARCOS Trevino MD Unavailable         Unavailable

 

                    Belinda, S Quco MD Unavailable         Unavailable

 

                    Belinda, S Quoc MD Unavailable         Unavailable

 

                    Belinda, S Quoc MD Unavailable         Unavailable

 

                    Belinda, S Quoc MD Unavailable         Unavailable

 

                    Belinda, S Quoc MD Unavailable         Unavailable

 

                    Belinda, S Quoc MD Unavailable         Unavailable

 

                    Belinda, S Quoc MD Unavailable         Unavailable

 

                    Belinda, S Quoc MD Unavailable         Unavailable

 

                    Belinda, S Quoc MD Unavailable         Unavailable

 

                    Belinda, S Quoc MD Unavailable         Unavailable

 

                    Belinda, S Quoc MD Unavailable         Unavailable

 

                    Belidna, S Quoc MD Unavailable         Unavailable

 

                    Belinda, S Quoc MD Unavailable         Unavailable

 

                    Belinda, S Quoc MD Unavailable         Unavailable

 

                    Belinda, S Quoc MD Unavailable         Unavailable

 

                    Belinda, S Quoc MD Unavailable         Unavailable

 

                    Belinda, S Quoc MD Unavailable         Unavailable

 

                    Belinda, S Quoc MD Unavailable         Unavailable

 

                    Belinda, S Quoc MD Unavailable         Unavailable

 

                    Belinda, S Quoc MD Unavailable         Unavailable

 

                    Belinda, S Quoc MD Unavailable         Unavailable

 

                    Nickerson,  Rishi RED   Unavailable         Unavailable

 

                    Nickerson,  Rishi RED   Unavailable         Unavailable

 

                    Nickerson,  Rishi RED   Unavailable         Unavailable

 

                    Nickerson,  Rishi RED   Unavailable         Unavailable

 

                    Nickerson,  Rishi RED   Unavailable         Unavailable

 

                    Nickerson,  Rishi RED   Unavailable         Unavailable

 

                    Nickerson,  Rishi RED   Unavailable         Unavailable

 

                    Nickerson,  Rishi RED   Unavailable         Unavailable

 

                    Nickerson,  Rishi RED   Unavailable         Unavailable

 

                    Krishan,  Rishi RED   Unavailable         Unavailable

 

                    Horner Griffith, SONA Blackwell MD, FACS Unavailable         Unavailable

 

                    Horner Griffith, SONA Blackwell MD, FACS Unavailable         Unavailable

 

                    Horner Griffith, SONA Blackwell MD, FACS Unavailable         Unavailable

 

                    Horner Griffith, SONA Blackwell MD, FACS Unavailable         Unavailable

 

                    Horner Griffith, SONA Blackwell MD, FACS Unavailable         Unavailable

 

                    Horner Griffith, SONA Blackwell MD, FACS Unavailable         Unavailable

 

                    Horner Griffith, SONA Blackwell MD, FACS Unavailable         Unavailable

 

                    Horner Griffith, SONA Blackwell MD, FACS Unavailable         Unavailable

 

                    Horner Griffith, SONA Blackwell MD, FACS Unavailable         Unavailable

 

                    Horner Griffith, SONA Blackwell MD, FACS Unavailable         Unavailable

 

                    Horner Griffith, SONA Blackwell MD, FACS Unavailable         Unavailable

 

                    Horner Griffith, SONA Blackwell MD, FACS Unavailable         Unavailable

 

                    Horner Griffith, SONA Blackwell MD, FACS Unavailable         Unavailable

 

                    Horner Griffith, SONA Blackwell MD, FACS Unavailable         Unavailable

 

                    Horner Griffith, SONA Blackwell MD, FACS Unavailable         Unavailable

 

                    Horner Griffith, SONA Blackwell MD, FACS Unavailable         Unavailable

 

                    Horner Griffith, SONA Blackwell MD, FACS Unavailable         Unavailable

 

                    Horner Griffith, SONA Blackwell MD, FACS Unavailable         Unavailable

 

                    Horner Griffith, SONA Blackwell MD, FACS Unavailable         Unavailable

 

                    Horner Griffith, SONA Blackwell MD, FACS Unavailable         Unavailable

 

                    Horner Griffith, SONA Blackwell MD, FACS Unavailable         Unavailable

 

                    Horner Griffith, SONA Blackwell MD, FACS Unavailable         Unavailable

 

                    Horner Griffith, SONA Blackwell MD, FACS Unavailable         Unavailable

 

                    Horner Griffith, SONA Blackwell MD, FACS Unavailable         Unavailable

 

                    Horner Griffith, SONA Blackwell MD, FACS Unavailable         Unavailable

 

                    Horner Griffith, SONA Blacwkell MD, FACS Unavailable         Unavailable

 

                    Horner Griffith, SONA Blackwell MD, FACS Unavailable         Unavailable

 

                    Horner Griffith, SONA Blackwell MD, FACS Unavailable         Unavailable

 

                    Horner Griffith, SONA Blackwell MD, FACS Unavailable         Unavailable

 

                    Horner Griffith, SONA Blackwell MD, FACS Unavailable         Unavailable

 

                    Horner Griffith, SONA Blackwell MD, FACS Unavailable         Unavailable

 

                    Horner Griffith, SONA Blackwell MD, FACS Unavailable         Unavailable

 

                    Horner Griffith, SONA Blackwell MD, FACS Unavailable         Unavailable

 

                    Horner Griffith, SONA Blackwell MD, FACS Unavailable         Unavailable

 

                    Horner Griffith, SONA Blackwell MD, FACS Unavailable         Unavailable

 

                    Horner Griffith, SONA Blackwell MD, FACS Unavailable         Unavailable

 

                    Horner Griffith, SONA Blackwell MD, FACS Unavailable         Unavailable

 

                    Horner Griffith, SONA Blackwell MD, FACS Unavailable         Unavailable

 

                    Horner Griffith, SNOA Blackwell MD, FACS Unavailable         Unavailable



                                  



Re-disclosure Warning

          The records that you are about to access may contain information from 
federally-assisted alcohol or drug abuse programs. If such information is 
present, then the following federally mandated warning applies: This information
has been disclosed to you from records protected by federal confidentiality 
rules (42 CFR part 2). The federal rules prohibit you from making any further 
disclosure of this information unless further disclosure is expressly permitted 
by the written consent of the person to whom it pertains or as otherwise 
permitted by 42 CFR part 2. A general authorization for the release of medical 
or other information is NOT sufficient for this purpose. The Federal rules 
restrict any use of the information to criminally investigate or prosecute any 
alcohol or drug abuse patient.The records that you are about to access may 
contain highly sensitive health information, the redisclosure of which is 
protected by Article 27-F of the [facility] Public Health law. If you 
continue you may have access to information: Regarding HIV / AIDS; Provided by 
facilities licensed or operated by the [facility] Office of Mental Health; 
or Provided by the [facility] Office for People With Developmental 
Disabilities. If such information is present, then the following [place] 
mandated warning applies: This information has been disclosed to you from 
confidential records which are protected by state law. State law prohibits you 
from making any further disclosure of this information without the specific 
written consent of the person to whom it pertains, or as otherwise permitted by 
law. Any unauthorized further disclosure in violation of state law may result in
a fine or residential sentence or both. A general authorization for the release of 
medical or other information is NOT sufficient authorization for further disc
losure.                                                                         
    



Encounters

          



           Encounter  Providers  Location   Date       Indications Data Source(s

)

 

             Outpatient   Attender: Quoc Trevino MD Main Office  10/05/2021 

08:45:00 AM EDT 

                                        [facility] ([facility])

 

                Unknown                         [address] 

[place] [address] 2021 12:00:00 AM 

EDT                                                 [facility]

 

                                        <td ID="encounterTypeDescriptionID0">1 Y

ear Follow-Up</td><td>Rishi Griffith MD, FACS</td><td>Rishi Schulz MD 
[facility]</td><td>09/15/2021</td><td>6:54AM</td><td>7:30AM</td><td></td>Outpatient 

Attender: Rishi Griffith MD, FACS Rishi Schulz MD [facility]  09/15/2021 06:54:0

0

AM EDT - 09/15/2021 07:30:00 AM EDT                           [facility] (Rishi Griffith MD [facility])

 

                Outpatient      Attender: Rishi Couch/Alla/Yony/Rein

dl 2021 

08:30:00 AM EDT                                     [facility] ([facility]

 

                Outpatient                      [address] 

[place] [address] 2021 12:00:00 AM

EDT                                                 [facility]

[facility])

 

                Unknown                         [address]

[place] [address] 2021 12:00:00 AM 

EDT                                                 [facility]CW1 ([facility]

[facility])

 

                Unknown                         [address]

[place] [address] 2021 12:00:00 AM 

EST                                                 [facility]CW1 ([facility]

[facility])

 

                Unknown                         [address]

[place] [address] 2020 12:00:00 AM 

EST                                                 eCW1 ([facility]

[facility])

 

                Unknown                         [address] [facility]

[place] [address] 2020 12:00:00 AM 

EST                                                 eCW1 ([facility]

[facility])

 

                Unknown                         [address] [facility]

[place] [address] 12/15/2020 12:00:00 AM 

EST                                                 eCW1 ([facility])



                                                                                
                                                                                
                



Immunizations

          



             Vaccine      Date         Status       Description  Data Source(s)

 

             COVID-19 VACC, MRNA(PFIZER)/PF 2021 12:00:00 AM EDT completed

                 [facility]

 

             COVID-19 VACCINE Pfizer 2021 12:00:00 AM EDT completed       

          [facility]

 

                                        Vaccine Series Complete: YESThis Data wa

s Submitted to [facility] Via [facility]. 

 

                    COVID-19 dose #2 given elsewhere Unspecified 2021 06:3

7:00 AM EST 

completed                                           eCW1 ([facility])

 

                    COVID-19 dose #2 given elsewhere Unspecified 2021 06:3

7:00 AM EST 

completed                                           eCW1 ([facility])

 

                    COVID-19 dose #2 given elsewhere Unspecified 2021 06:3

7:00 AM EST 

completed                                           eCW1 ([facility])

 

             COVID-19 VACCINE Pfizer 2021 12:00:00 AM EST completed       

          [facility]

 

                                        Vaccine Series Complete: YESThis Data wa

s Submitted to [facility]. 

 

                    COVID-19 dose #1 given elsewhere Unspecified 2020 06:3

6:00 AM EST 

completed                                           eCW1 ([facility])

 

                    COVID-19 dose #1 given elsewhere Unspecified 2020 06:3

6:00 AM EST 

completed                                           eCW1 ([facility])

 

                    COVID-19 dose #1 given elsewhere Unspecified 2020 06:3

6:00 AM EST 

completed                                           eCW1 ([facility])

 

             COVID-19 VACCINE Pfizer 2020 12:00:00 AM EST completed       

          [facility]

 

                                        Vaccine Series Complete: NOThis Data was

 Submitted to [facility] Via [facility]. 

 

             TB Skin test is not vaccine. 2020 08:42:00 AM EST completed  

               [facility] 

([facility])



                                                                                
                                                                                
                          



Medications

          



          Medication Brand Name Start Date Product Form Dose      Route     Admi

nistrative 

Instructions Pharmacy Instructions Status     Indications Reaction   Description

 Data 

Source(s)

 

        240 mcg/0.7 mL         10/22/2021 12:00:00 AM EDT syringe 0             

  INJECT AS DIRECTED 

INJECT AS DIRECTED SOLD: 10/22/2021                                        [facility]

 

                    pantoprazole 40 MG Delayed Release Oral Tablet PANTOPRAZOLE 

SODIUM 10/05/2021 

12:00:00 AM EDT tablet,delayed release (DR/EC) 90                              T

AMY ONE TABLET BY MOUTH 

EVERY MORNING TAKE ONE TABLET BY MOUTH EVERY MORNING SOLD: 10/05/2021           

                       

[facility]

 

                    pantoprazole 40 MG Delayed Release Oral Tablet Pantoprazole 

Sodium 10/05/2021 

12:00:00 AM EDT               ORAL                 active                      M

[facility] ([facility])



                                                                                
       



Insurance Providers

          



             Payer name   Policy type / Coverage type Policy ID    Covered party

 ID Covered 

party's relationship to sunshine Policy Sunshine             Plan Information

 

          [employer] [place]           [id number]           WI2       

          [id number]

 

          [employer] [place]           [id number]           WI2       

          [id number]

 

          MEDICARE            [id number]           SP                  [id number]

A

 

          [employer]           [id number]           Unk                 VYS

[id number]

 

          [employer] [place] [address]           [id number]           WI2      

           [id number]

 

          SELF PAY ONLY           [id number]           SP                  [id number]

[id number]

 

          [employer] B         [id number]           P                   VYS

[id number]

 

          [employer]           [id number] Blue Cross/Shield   

        [id number]

 

             [employer]              [id number]

[id number] Blue 

Cross/Shield                                        [id number]

 

          [employer] BLUE CROSS BLUE SHIELD HEA       [id number]   

                [id number]

 

           BCBS of [facility]      0          [id number] Famil

y Dependent Rdnegin Montanezjonnathan                                  0

 

                [employer]                 [id number]

[id number] 

[id number] Blue Cross/Shield                       [id number]

[id number]

 

                    AN[employer]-Commercial [id number]        

                       

[id number]

 

                    ANSI-Medicare Part B [id number]   

                            

[id number]

 

                    ANSI-Medicare Part B [id number]   

                            

[id number]

 

                    AN[employer]-Commercial [id number]        

                       

[id number]

 

                    ANSI-Medicare Part B [id number]   

                            

[id number]

 

                    ANSI-Medicare Part B [id number]   

                            

[id number]

 

                    ANSI-Medicare Part B [id number]   

                            

[id number]

 

                    ANSI-Commercial [id number]        

                       

[id number]

 

                    ANSI-Medicare Part B [id number]   

                            

[id number]

 

                    ANSI-Medicare Part B [id number]   

                            

[id number]

 

                    ANSI-Commercial [id number]        

                       

[id number]

 

                    ANSI-Medicare Part B [id number]   

                            

[id number]

 

                    ANSI-Medicare Part B [id number]   

                            

[id number]

 

                    ANSI-Commercial [id number]        

                       

[id number]

 

                    [facility] [id number]   

                            

[id number]

 

          MEDICARE            [id number]           SP                  [id number]

A

 

          [employer] P         [id number]           P                   VYS

[id number]

 

          [employer] P         [id number]           P                   VYA

[id number]

 

          [employer] P         UNAVAILABLE           S                   UNAV

AILABLE

 

          [employer] [place] [address]           [id number]           WI2      

           [id number]

 

                              [id number]                               [id number]



 

           [employer]BS of [facility] Other      0          [id number] Famil

y Dependent Shahandeh 

Haghir                                  0

 

          [employer] [place] [address]           [id number]           WI2       

          [id number]

 

          MEDICARE            [id number]           SP                  [id number]

A

 

          [employer] [place] [address]           [id number]           WI2      

           [id number]



                                                                                
                                                                                
                                                                                
                                                                                
                                                                                
                                           



Problems, Conditions, and Diagnoses

          



           Code       Display Name Description Problem Type Effective Dates Data

 Source(s)

 

           [id number]   Abdominal pain Abdominal pain Problem    10/05/2021 12:00:

00 AM EDT [facility]

([facility])

 

             [id number]     Essential hypertension Essential hypertension Problem 

     2021 

12:00:00 AM EDT                         [facility] ([facility], )

 

           E53.8      [id number]  Vitamin B12 deficiency Problem    12/15/2020 12

:00:00 AM EST eCW1 

([facility])



                                                                                
                                     



Surgeries/Procedures

          



             Procedure    Description  Date         Indications  Data Source(s)

 

             OFFICE OUTPATIENT NEW 30 MINUTES              10/05/2021 12:00:00 A

M EDT              [facility] 

([facility])

 

             OFFICE OUTPATIENT NEW 30 MINUTES              2021 12:00:00 A

M EDT              [facility] 

([facility], )



                                                                                
                 



Results

          No Information                                                        
                     



Social History

          



           Code       Duration   Value      Status     Description Data Source(s

)

 

             Smoking      10/30/2021 10:23:25 AM EDT Never smoked tobacco (findi

ng) completed    

Never smoked tobacco (finding)          IR[facility] (Rishi Griffith MD [facility])

 

             Smoking      2021 12:00:00 AM EDT Patient has never smoked co

mpleted    Patient 

has never smoked                        [facility] ([facility], )

 

           Smoking    2021 12:00:00 AM EDT Never Smoker completed  Never S

moker eCW1 

([facility])

 

           Smoking    2021 12:00:00 AM EDT Never Smoker completed  Never S

moker eCW1 

([facility])

 

           Smoking    2021 12:00:00 AM EDT Never Smoker completed  Never S

moker eCW1 

([facility])



                                                                                
                                                         



Vital Signs

          



                    ID                  Date                Data Source

 

                    UNK                                      









           Name       Value      Range      Interpretation Code Description Data

 Source(s)

 

           Body height 68 [in_i]                        68 [in_i]  [facility] ([facility]

[facility])

 

                                        5'8" 

 

           Body weight 156.00 [lb_av]                       156.00 [lb_av] MEDEN

T ([facility])

 

           Systolic blood pressure 130 mm[Hg]                       130 mm[Hg] M

EDENT ([facility])

 

           Diastolic blood pressure 86 mm[Hg]                        86 mm[Hg]  

[facility] ([facility])

 

           Heart rate 86 /min                          86 /min    [facility]

 

           Body mass index (BMI) [Ratio] 23.7 kg/m2                       23.7 k

g/m2 [facility]

 

           Body weight 70.762 kg                        70.762 kg  [facility] ([facility])

 

           Body temperature 97.7 [degF]                       97.7 [degF] MED[facility]

 ([facility])

 

           Body weight 155.6 [lb_av]                       155.6 [lb_av] eCW1 (Counts include 234 beds at the [facility])

 

           Respiratory rate 18 /min                          18 /min    eCW1 ([facility])

 

           Body height 69 [in_i]                        69 [in_i]  eCW1 ([facility])

 

           Body temperature 98.3 [degF]                       98.3 [degF] eCW1 (

[facility])

 

           Body mass index (BMI) [Ratio] 22.98 kg/m2                       22.98

 kg/m2 eCW1 ([facility])

 

           Systolic blood pressure 128 mm[Hg]                       128 mm[Hg] e

CW1 ([facility])

 

           Diastolic blood pressure 84 mm[Hg]                        84 mm[Hg]  

eCW1 ([facility])

 

           Heart rate 89 /min                          89 /min    W1 ([facility])

## 2021-11-12 NOTE — CCD
Summarization of Episode Note

                             Created on: 2021



ROXANNE CONNELL

External Reference #: [id number]

: 1950

Sex: Male



Demographics





                          Address                   [address] [place] [address]  [address]

 

                          Home Phone                [phone]

 

                          Preferred Language        Unknown

 

                          Marital Status            Unknown

 

                          Worship Affiliation     Unknown

 

                          Race                      White

 

                          Ethnic Group              Not  or 





Author





                          Author                    [facility]

ems

 

                          Organization              [facility]

ems

 

                          Address                   Unknown

 

                          Phone                     Unavailable







Support





                Name            Relationship    Address         Phone

 

                    ROXANNE CONNELL                [address]

[place]  [address]                    [phone]

 

                    AYAKA JOHNSTON   ECON                [address] [place] [address]  [address]                    [phone]







Care Team Providers





                    Care Team Member Name Role                Phone

 

                    Jose R Truong       Unavailable         [phone]







PROBLEMS





          Type      Condition ICD9-CM Code WGE41-DY Code Onset Dates Condition S

tatus W/U 

Status              Risk                SNOMED Code         Notes

 

           Problem    Encounter for long-term (current) use of other medications

            Z79.899               

Active          confirmed                       [id number]       Labs are monitor

ed regularly and last done in 2018, pending in 2019.

 

       Problem Vitamin B12 deficiency        E53.8         Active confirmed     

   [id number] His level 

was 233 in 2017 and he is on supplementation with a B12 level of 672 in 
2021.

 

       Problem Palpitations        R00.2         Active confirmed        [id number]

2 There is a history of

palpitations likely related to PVCs and this is really not an active issue. His 
cardiac function has been normal on previous testing. He has an Apple Watch and 
can see his PVCs.

 

       Problem Arthritis        M19.90        Active confirmed        [id number]  

 

       Problem Vitamin D deficiency        E55.9         Active confirmed       

 [id number] He is vitamin

D deficient and is on supplementation.  Most recent vitamin D level was 46.5 in 
2021.

 

       Problem Abnormal ECG        R94.31        Active confirmed        6037552

03 He has 

nondiagnostic Q waves in the inferior leads and there has been no change on his 
EKG at least dating back to . He had a radionuclide stress test in  
which showed no reversible ischemia and normal left ventricular function; this 
was similar on stress echo in 3/2013.  He has excellent exercise tolerance.  He 
denies any chest pain.

 

       Problem Insomnia        G47.00        Active confirmed        [id number] A

n issue in the past; 

it is relatively quiescent at present.

 

       Problem Hypertension        I10           Active confirmed        7153119

3 He was started on 

antihypertensive therapy in . Blood pressure is controlled. He takes 
lisinopril 10 mg daily.  He monitors home readings also.

 

       Problem Hypercholesterolemia        E78.00        Active confirmed       

 [id number] On 

simvastatin, which was started in . Lipids have historically been 
controlled, and were last checked and were optimal in 2021.







ALLERGIES





                    Allergen (clinical drug ingredient) Drug/Non Drug Allergy do

cumented on EMR 

Reaction            Allergy Type        Onset Date          Status

 

                      IV Contrast Dye Hives      Drug Allergy            Active







ENCOUNTERS from 1950 to 2021





             Encounter    Location     Date         Provider     Diagnosis

 

                    [facility]          [address] [phone] [place] [address] 22 Sep, 2021

                          Jose R Dionne               Hypercholesterolemia E78.00







IMMUNIZATIONS





                Vaccine         Route           Administration Date Status

 

                COVID-19 dose #2 given elsewhere Unspecified Unknown         2021    Administered

 

                COVID-19 dose #1 given elsewhere Unspecified Unknown         Dec

 22, 2020    Administered

 

                Zoster 50mcg/0.5mL Shingrix Unknown         2020   Admi

nistered

 

                Zoster 50mcg/0.5mL Shingrix Unknown         Dec 07, 2019    Admi

nistered

 

                Zoster 0.65mL Zostavax Unknown         Aug 19, 2015    Administe

red

 

                Pneumococcal Adult 0.5mL Pneumovax 23 IM Intramuscular 2016   

Administered

 

                TDAP 0.5mL (Boostrix) IM Intramuscular 2019   Administe

red

 

                Pneumococcal 0.5mL Prevnar 13 Unknown         May 05, 2015    Ad

ministered







SOCIAL HISTORY

Tobacco Use:



                    Social History Observation Description         Date

 

                    Details (start date - stop date) Never Smoker         



Sex Assigned At Birth:



                          Social History Observation Description

 

                          Sex Assigned At Birth     Unknown



Education:



                    Question            Answer              Notes

 

                    Level of Education: Professional Schools/Masters/PhD  



Audit



                    Question            Answer              Notes

 

                    Total Score:        3                    

 

                    Interpretation:     Alcohol Education    



Language:



                    Question            Answer              Notes

 

                    Languages spoken:   English              



Pentecostalism:



                    Question            Answer              Notes

 

                    Pentecostalism                                No Mormon beliefs

 that would impact health care.



Domestic Violence:



                    Question            Answer              Notes

 

                    Status:                           



Sexual Hx:



                    Question            Answer              Notes

 

                    Had sex in the last 12 months (vaginal, oral, or anal)? Yes 

                 

 

                    Have you ever had an STD? No                   

 

                    with                Women only           



Drug and Alcohol



                    Question            Answer              Notes

 

                    Total Score:        0                    

 

                    Interpretation:     No problems reported  



Alcohol Screening:



                    Question            Answer              Notes

 

                    Did you have a drink containing alcohol in the past year? Ye

s                  

 

                    Points              3                    

 

                    Interpretation      Negative             

 

                          How often did you have six or more drinks on one occas

ion in the past year? 

Never (0 points)                         

 

                                        How many drinks did you have on a typica

l day when you were drinking in the past

year?                     1 or 2 (0 points)          

 

                          How often did you have a drink containing alcohol in t

he past year? Two to three

times per week (3 points)                



Tobacco Use:



                    Question            Answer              Notes

 

                    Are you a:          never smoker        never smoker







REASON FOR REFERRAL

No Information



VITAL SIGNS

No information



MEDICATIONS





           Medication SIG (Take, Route, Frequency, Duration) Notes      Start Da

te End Date   

Status

 

           Lisinopril 10 MG 1 tablet Orally Once a day for 90 days              

                    Active

 

           Zocor 20 MG 1 tablet in the evening Orally Once a day for 90 day(s)  

                                Active

 

           Aspirin 81 MG 1 tablet Orally Once a day                             

     Active

 

           Finasteride 5 MG 1 tablet Orally Once a day for 90 days            15

 May, 2017            Active







PROCEDURES

No Information



RESULTS

No Results



REASON FOR VISIT

refill-zocor and proscar 



MEDICAL (GENERAL) HISTORY





                    Type                Description         Date

 

                    Medical History     Hypertension         

 

                    Medical History     Hypercholesterolemia  

 

                    Medical History     Abnormal ECG         

 

                    Medical History     Palpitations         

 

                    Medical History     Insomnia             

 

                    Medical History     Arthritis            

 

                    Surgical History    Colonoscopy and upper endoscopy 2003

 

                    Surgical History    Lumbar laminectomy, L4 complete L3 & L5 

half 2012

 

                    Surgical History    Colonoscopy and upper endoscopy 2015







Goals Section

No Information



Health Concerns

No Information



MEDICAL EQUIPMENT

No Information



MENTAL STATUS

No Information



FUNCTIONAL STATUS

No Information



ASSESSMENTS





             Encounter Date Diagnosis    Assessment Notes Treatment Notes Treatm

ent Clinical 

Notes

 

                22 Sep, 2021    Hypercholesterolemia (ICD-10 - E78.00)          

       



                                        











PLAN OF TREATMENT

Medication



                Medication Name Sig             Start Date      Stop Date

 

                Finasteride 5 MG 1 tablet Orally Once a day for 90 days 15 May, 

2017     

 

                Zocor 20 MG     1 tablet in the evening Orally Once a day for 90

 day(s)                  



Next Appt



                                        Details

 

                                        Provider Name:Jose R Truong, 2022 07

:30:00 AM, [address], 

[phone], [place], [address], [phone]







Insurance Providers





             Payer Name   Payer Address Payer Phone  Insured Name Patient Relati

onship to 

Insured                   Coverage Start Date       Coverage End Date

 

                    [employer]  [address] [facility] [employer] [place] [address] 

[phone]    ROXANNE CONNELL                                   

 

                MEDICARE PART A ONLY [facility] [address]  [facility] [id number] [id number]    

ROXANNE CONNELL     self

## 2021-11-12 NOTE — CCD
Continuity of Care Document (CCD)

                             Created on: 2021



Deon Kim

External Reference #: [id number]

: 1950

Sex: Male



Demographics





                          Address                   [address]

[place]  [address]

 

                          Home Phone                [phone]

 

                          Preferred Language        Unknown

 

                          Marital Status            Unknown

 

                          Latter day Affiliation     Unknown

 

                          Race                      Unknown

 

                          Ethnic Group              Unknown





Author





                          Author                    Kim NUNEZ MD

 

                          Organization              Unknown

 

                          Address                   [address] , [facility]

[place]  [address]



 

                          Phone                     [phone]







Care Team Providers





                    Care Team Member Name Role                Phone

 

                    Jose R Truong M.D.   AUT[employer]                [phone]







Problems





                    Active Problems     Provider            Date

 

                    Essential hypertension Rishi Nunez MD    Onset: 2021







Social History





                Type            Date            Description     Comments

 

                Birth Sex                       Unknown          

 

                ETOH Use                        Occasionally consumes alcohol  

 

                Tobacco Use     Start: Unknown  Patient has never smoked  

 

                Recreational Drug Use                 Denies Drug Use  

 

                Smoking Status  Reviewed: 21 Patient has never smoked  







Allergies, Adverse Reactions, Alerts





             Active Allergies Reaction     Severity     Comments     Date

 

             NKDA                                                2021

 

                Radioactive Iodine                                 possible. hiv

es a long time ago. Recently has had no 

problem                                 2021







Medications





           Active Medications SIG        Qnty       Indications Ordering Provide

r Date

 

                          Simvastatin                     20mg Tablets          

         Take 1 Tablet By 

Mouth Every Evening                                 Unknown         

 

             Lisinopril                     10mg Tablets                        

                                  Jose R Truong M.D.                             

 

                          Aspirin 81 Low Dose                     81mg Chewtabs 

                  1 by 

mouth every day                                 Unknown         







Immunizations





                                        Description

 

                                        No Information Available







Vital Signs





                                        Description

 

                                        No Information Available







Results





                                        Description

 

                                        No Information Available







Procedures





                Date            Code            Description     Status

 

                2021      51491           Office/Outpatient New Low MDM 30

-44 Minutes Completed







Medical Devices





                                        Description

 

                                        No Information Available







Encounters





           Type       Date       Location   Provider   Dx         Diagnosis

 

           Office Visit 2021  8:30a [facility] Rishi Nunez MD

 M21.6x1    

Other acquired deformities of right foot

 

                          M79.671                   Pain in right foot







Assessments





                Date            Code            Description     Provider

 

                2021      M21.6x1         Osteochondral defect of talus Da

mark Nunez MD

 

                2021      M79.671         Foot pain       Rishi Nunez MD







Plan of Treatment

2021 - Rishi Nunez MD* M21.6x1 Osteochondral defect of talus* New Xrays:
  * MRI Ankle W/O Contrast Right, Ordered: 21



* Comments:* The patient demonstrates pain in the right foot and ankle with some
  history of past traumatic injuries.  There is evidence of a lateral talar dome
  osteochondral defect with pain in the anterior lateral joint line and into the
  lateral aspect of the forefoot.  I would like to evaluate the ankle and 
  forefoot for any soft tissue injuries and to evaluate for any loose bodies and
  the extent of this osteochondral defect and acuity.  I have ordered an MRI of 
  the right foot and ankle.  The patient will be placed in a ankle lace up brace
  for activities and I provided him with a home exercise program.



* Follow up:* Post MRI.  Patient may elect for in person or telephone follow-up 
  visit at his request.





* M79.671 Foot pain* New Xrays:* MRI Foot W/O Contrast Right, Ordered: 21



* Comments:* As above









Functional Status





                                        Description

 

                                        No Information Available







Mental Status





                                        Description

 

                                        No Information Available







Referrals





                                        Description

 

                                        No Information Available

## 2021-11-12 NOTE — CCD
Continuity of Care Document (CCD)

                             Created on: 10/05/2021



Kim Chavarria

External Reference #: [id number]

: 1950

Sex: Male



Demographics





                          Address                   [address]  [address]

 

                          Home Phone                [phone]

 

                          Preferred Language        Unknown

 

                          Marital Status            Unknown

 

                          Judaism Affiliation     Unknown

 

                          Race                      Unknown

 

                          Ethnic Group              Unknown





Author





                          Author                    Kim TREVINO M.D

 

                          Organization              Unknown

 

                          Address                   [address] [place] [address]  [id number]



 

                          Phone                     [phone]







Care Team Providers





                    Care Team Member Name Role                Phone

 

                    Jose R Truong M.D.   AUTM                [phone]







Problems





                    Active Problems     Provider            Date

 

                    Abdominal pain      Quoc Trevino M.D. Onset: 10/05/20

21







Social History





                Type            Date            Description     Comments

 

                Birth Sex                       Unknown          

 

                ETOH Use                        Occasionally     

 

                Tobacco Use     Start: Unknown  Patient has never smoked  







Allergies and adverse reactions





             Active Allergies Criticality  Reaction | Severity Comments     Date

 

             Iodine       Unable to assess criticality                          

 10/05/2021

 

             Contrast Dye Unable to assess criticality                          

 10/05/2021







Medications





           Active Medications SIG        Qnty       Indications Ordering Provide

r Date

 

                          Pantoprazole Sodium                     40mg Tablets D

R                   1 tab 

by mouth every morning 90tabs                          Quoc Trevino M.D. 

10/05/2021

 

             Lisinopril                     10mg Tablets                        

                                  

Jose R Truong M.D.                        

 

                          Simvastatin                     20mg Tablets          

         Take 1 Tablet By 

Mouth Every Evening                                 Unknown         

 

             Aspirin Low Dose                     81mg Tablets DR               

                                           

Unknown                                 







Immunizations





                                        Description

 

                                        No Information Available







Vital Signs





                Date            Vital           Result          Comment

 

                10/05/2021  8:54am Height          68 inches       5'8"

 

                    Weight              156.00 lb            

 

                    BP Systolic         130 mmHg             

 

                    BP Diastolic        86 mmHg              

 

                    Heart Rate          86 /min              

 

                    BMI (Body Mass Index) 23.7 kg/m2           

 

                    Weight              70.762 kg            

 

                    Body Temperature    97.7 F             







Results





                                        Description

 

                                        No Information Available







Procedures





                Date            Code            Description     Status

 

                10/05/2021      02611           Office/Outpatient New Low MDM 30

-44 Minutes Completed







Medical Devices





                                        Description

 

                                        No Information Available







Encounters





           Type       Date       Location   Provider   Dx         Diagnosis

 

           Office Visit 10/05/2021  8:45a Main Office Quoc Trevino M.D. R

10.9      

Unspecified abdominal pain







Assessments





                Date            Code            Description     Provider

 

                10/05/2021      R10.9           Abdominal pain  Quoc gabriel M.D.







Plan of Treatment

Future Appointment(s):* 2021  1:45 pm - Quoc Trevino M.D. at Main 
  Office

10/05/2021 - Quoc Trevino M.D.* R10.9 Abdominal pain* New Xrays:* CT Abd 
  & Pelvis, Ordered: 10/05/21



* Comments:* 72 yo m who presents for a h/o upper, and left sided abdominal pain
  for the last 3 to 4  weeks.  Last scopes were in . Positive  c/o abdominal
  pain mostly luq. Has daily symptoms. Weight is down. No c/o nausea/vomiting.  
  Plan:1. Abdominal ct scan for early satiety/abdominal pain/weight loss.2. 
  Colonoscopy /egd to be set up.3. Pantoprazole 40 mgs po qam.4. Heating pad.









Functional Status





                                        Description

 

                                        No Information Available







Mental Status





                                        Description

 

                                        No Information Available







Referrals





                                        Description

 

                                        No Information Available

## 2021-11-12 NOTE — ROOR
________________________________________________________________________________

Patient Name: Kim Chavarria          Procedure Date: 11/12/2021 11:16 AM

MRN: [id number]                          Account Number: [id number]

YOB: 1950               Age: 71

Room: [facility]                            Gender: Male

Note Status: Finalized                 

________________________________________________________________________________

 

Procedure:            Total Colonoscopy to Cecum

Indications:          Screening for colorectal malignant neoplasm

Providers:            Quoc Trevino MD

Referring MD:         Jose R Truong MD

Requesting Provider:  

Medicines:            Monitored Anesthesia Care

Complications:        No immediate complications.

________________________________________________________________________________

Procedure:            Pre-Anesthesia Assessment:

                      - The heart rate, respiratory rate, oxygen saturations, 

                      blood pressure, adequacy of pulmonary ventilation, and 

                      response to care were monitored throughout the procedure.

                      The Colonoscope was introduced through the anus and 

                      advanced to the cecum, identified by appendiceal orifice 

                      and ileocecal valve. The colonoscopy was performed 

                      without difficulty. The patient tolerated the procedure 

                      well. The quality of the bowel preparation was excellent.

                                                                                

Findings:

     The perianal and digital rectal examinations were normal.

     Non-bleeding internal hemorrhoids were found during retroflexion. The 

     hemorrhoids were small and Grade I (internal hemorrhoids that do not 

     prolapse).

     Multiple small and large-mouthed diverticula were found in the entire 

     colon.

     The exam was otherwise without abnormality on direct and retroflexion 

     views.

                                                                                

Impression:           - Non-bleeding internal hemorrhoids.

                      - Diverticulosis in the entire examined colon.

                      - The examination was otherwise normal on direct and 

                      retroflexion views.

                      - No specimens collected.

                      - The exam was otherwise normal to the cecum.

Recommendation:       - Patient has a contact number available for 

                      emergencies. The signs and symptoms of potential delayed 

                      complications were discussed with the patient. Return to 

                      normal activities tomorrow. Written discharge 

                      instructions were provided to the patient.

                      - High fiber diet.

                      - Discharge patient to home.

                      - Continue present medications.

                      - Repeat colonoscopy PRN for screening purposes.

                      - Return to referring physician.

                      - The findings and recommendations were discussed with 

                      the patient.

                                                                                

Procedure Code(s):    --- Professional ---

                      95757, Colonoscopy, flexible; diagnostic, including 

                      collection of specimen(s) by brushing or washing, when 

                      performed (separate procedure)

Diagnosis Code(s):    --- Professional ---

                      Z12.11, Encounter for screening for malignant neoplasm 

                      of colon

                      K64.0, First degree hemorrhoids

                      K57.30, Diverticulosis of large intestine without 

                      perforation or abscess without bleeding

 

CPT copyright 2019 [employer]. All rights reserved.

 

The codes documented in this report are preliminary and upon  review may 

be revised to meet current compliance requirements.

 

Quoc Trevino MD

____________________

Quoc Trevino MD

11/12/2021 11:47:46 AM

Electronically signed by Quoc Trevino MD

Number of Addenda: 0

 

Note Initiated On: 11/12/2021 11:16 AM

Estimated Blood Loss: Estimated blood loss: none.

## 2022-03-17 ENCOUNTER — HOSPITAL ENCOUNTER (OUTPATIENT)
Dept: HOSPITAL 53 - M PLALAB | Age: 72
End: 2022-03-17
Attending: INTERNAL MEDICINE
Payer: COMMERCIAL

## 2022-03-17 DIAGNOSIS — E55.9: ICD-10-CM

## 2022-03-17 DIAGNOSIS — E78.00: Primary | ICD-10-CM

## 2022-03-17 DIAGNOSIS — Z12.5: ICD-10-CM

## 2022-03-17 DIAGNOSIS — E53.8: ICD-10-CM

## 2022-03-17 DIAGNOSIS — I10: ICD-10-CM

## 2022-03-17 LAB
25(OH)D3 SERPL-MCNC: 46.2 NG/ML (ref 30–100)
ALBUMIN SERPL BCG-MCNC: 4.1 GM/DL (ref 3.2–5.2)
ALT SERPL W P-5'-P-CCNC: 30 U/L (ref 12–78)
BASOPHILS # BLD AUTO: 0 10^3/UL (ref 0–0.2)
BASOPHILS NFR BLD AUTO: 0.2 % (ref 0–1)
BILIRUB SERPL-MCNC: 0.4 MG/DL (ref 0.2–1)
BUN SERPL-MCNC: 14 MG/DL (ref 7–18)
CALCIUM SERPL-MCNC: 9.4 MG/DL (ref 8.8–10.2)
CHLORIDE SERPL-SCNC: 106 MEQ/L (ref 98–107)
CHOLEST SERPL-MCNC: 140 MG/DL (ref ?–200)
CHOLEST/HDLC SERPL: 3.04 {RATIO} (ref ?–5)
CO2 SERPL-SCNC: 32 MEQ/L (ref 21–32)
CREAT SERPL-MCNC: 1.03 MG/DL (ref 0.7–1.3)
EOSINOPHIL # BLD AUTO: 0.1 10^3/UL (ref 0–0.5)
EOSINOPHIL NFR BLD AUTO: 2.4 % (ref 0–3)
ERYTHROCYTE [SEDIMENTATION RATE] IN BLOOD BY WESTERGREN METHOD: 5 MM/HR (ref 0–20)
EST. AVERAGE GLUCOSE BLD GHB EST-MCNC: 117 MG/DL (ref 60–110)
GFR SERPL CREATININE-BSD FRML MDRD: > 60 ML/MIN/{1.73_M2} (ref 42–?)
GLUCOSE SERPL-MCNC: 100 MG/DL (ref 70–100)
HCT VFR BLD AUTO: 42.9 % (ref 42–52)
HDLC SERPL-MCNC: 46 MG/DL (ref 40–?)
HGB BLD-MCNC: 14 G/DL (ref 13.5–17.5)
LDLC SERPL CALC-MCNC: 79 MG/DL (ref ?–100)
LYMPHOCYTES # BLD AUTO: 1.9 10^3/UL (ref 1.5–5)
LYMPHOCYTES NFR BLD AUTO: 46 % (ref 24–44)
MAGNESIUM SERPL-MCNC: 2.1 MG/DL (ref 1.8–2.4)
MCH RBC QN AUTO: 28.4 PG (ref 27–33)
MCHC RBC AUTO-ENTMCNC: 32.6 G/DL (ref 32–36.5)
MCV RBC AUTO: 87 FL (ref 80–96)
MONOCYTES # BLD AUTO: 0.5 10^3/UL (ref 0–0.8)
MONOCYTES NFR BLD AUTO: 11.1 % (ref 2–8)
NEUTROPHILS # BLD AUTO: 1.7 10^3/UL (ref 1.5–8.5)
NEUTROPHILS NFR BLD AUTO: 40.1 % (ref 36–66)
NONHDLC SERPL-MCNC: 94 MG/DL
PLATELET # BLD AUTO: 224 10^3/UL (ref 150–450)
POTASSIUM SERPL-SCNC: 4.6 MEQ/L (ref 3.5–5.1)
PROT SERPL-MCNC: 7 GM/DL (ref 6.4–8.2)
RBC # BLD AUTO: 4.93 10^6/UL (ref 4.3–6.1)
SODIUM SERPL-SCNC: 143 MEQ/L (ref 136–145)
T4 FREE SERPL-MCNC: 0.99 NG/DL (ref 0.76–1.46)
TRIGL SERPL-MCNC: 77 MG/DL (ref ?–150)
TSH SERPL DL<=0.005 MIU/L-ACNC: 3.23 UIU/ML (ref 0.36–3.74)
VIT B12 SERPL-MCNC: 894 PG/ML (ref 247–911)
WBC # BLD AUTO: 4.2 10^3/UL (ref 4–10)

## 2022-03-17 PROCEDURE — 85025 COMPLETE CBC W/AUTO DIFF WBC: CPT

## 2022-03-17 PROCEDURE — 80053 COMPREHEN METABOLIC PANEL: CPT

## 2022-03-17 PROCEDURE — 82306 VITAMIN D 25 HYDROXY: CPT

## 2022-03-17 PROCEDURE — 84439 ASSAY OF FREE THYROXINE: CPT

## 2022-03-17 PROCEDURE — 82607 VITAMIN B-12: CPT

## 2022-03-17 PROCEDURE — 84443 ASSAY THYROID STIM HORMONE: CPT

## 2022-03-17 PROCEDURE — 85652 RBC SED RATE AUTOMATED: CPT

## 2022-03-17 PROCEDURE — 83735 ASSAY OF MAGNESIUM: CPT

## 2022-03-17 PROCEDURE — 36415 COLL VENOUS BLD VENIPUNCTURE: CPT

## 2022-03-17 PROCEDURE — 83036 HEMOGLOBIN GLYCOSYLATED A1C: CPT

## 2022-03-17 PROCEDURE — 80061 LIPID PANEL: CPT
